# Patient Record
Sex: FEMALE | Race: WHITE | Employment: UNEMPLOYED | URBAN - METROPOLITAN AREA
[De-identification: names, ages, dates, MRNs, and addresses within clinical notes are randomized per-mention and may not be internally consistent; named-entity substitution may affect disease eponyms.]

---

## 2023-02-14 ENCOUNTER — OFFICE VISIT (OUTPATIENT)
Dept: PULMONOLOGY | Age: 33
End: 2023-02-14

## 2023-02-14 VITALS
SYSTOLIC BLOOD PRESSURE: 120 MMHG | WEIGHT: 184 LBS | TEMPERATURE: 98 F | HEIGHT: 64 IN | DIASTOLIC BLOOD PRESSURE: 74 MMHG | BODY MASS INDEX: 31.41 KG/M2 | OXYGEN SATURATION: 98 % | RESPIRATION RATE: 18 BRPM | HEART RATE: 76 BPM

## 2023-02-14 DIAGNOSIS — Z71.6 TOBACCO ABUSE COUNSELING: ICD-10-CM

## 2023-02-14 DIAGNOSIS — E10.9 TYPE 1 DIABETES MELLITUS WITHOUT COMPLICATION (HCC): Primary | ICD-10-CM

## 2023-02-14 PROCEDURE — 99204 OFFICE O/P NEW MOD 45 MIN: CPT | Performed by: INTERNAL MEDICINE

## 2023-02-14 RX ORDER — LEVOTHYROXINE SODIUM ANHYDROUS 100 UG/5ML
20 INJECTION, POWDER, LYOPHILIZED, FOR SOLUTION INTRAVENOUS 3 TIMES DAILY
COMMUNITY

## 2023-02-14 RX ORDER — LITHIUM CARBONATE 300 MG/1
300 TABLET, FILM COATED, EXTENDED RELEASE ORAL 2 TIMES DAILY
COMMUNITY

## 2023-02-14 RX ORDER — AMOXICILLIN 250 MG
1 CAPSULE ORAL DAILY
COMMUNITY

## 2023-02-14 RX ORDER — DEXTROAMPHETAMINE SULFATE 5 MG/1
5 TABLET ORAL DAILY
COMMUNITY

## 2023-02-14 RX ORDER — CLOZAPINE 25 MG/1
25 TABLET ORAL NIGHTLY
COMMUNITY

## 2023-02-14 RX ORDER — ACETAMINOPHEN 500 MG
500 TABLET ORAL EVERY 6 HOURS PRN
COMMUNITY

## 2023-02-14 RX ORDER — DEXTROAMPHETAMINE SACCHARATE, AMPHETAMINE ASPARTATE, DEXTROAMPHETAMINE SULFATE AND AMPHETAMINE SULFATE 5; 5; 5; 5 MG/1; MG/1; MG/1; MG/1
24 TABLET ORAL DAILY
COMMUNITY

## 2023-02-14 RX ORDER — SENNA PLUS 8.6 MG/1
2 TABLET ORAL DAILY
COMMUNITY

## 2023-02-14 RX ORDER — CLOZAPINE 100 MG/1
100 TABLET ORAL DAILY
COMMUNITY

## 2023-02-14 RX ORDER — BISACODYL 10 MG
10 SUPPOSITORY, RECTAL RECTAL DAILY
COMMUNITY

## 2023-02-14 RX ORDER — LEVETIRACETAM 500 MG/1
1000 TABLET, EXTENDED RELEASE ORAL DAILY
COMMUNITY

## 2023-02-14 RX ORDER — GABAPENTIN 300 MG/1
300 CAPSULE ORAL 3 TIMES DAILY
COMMUNITY

## 2023-02-14 NOTE — ASSESSMENT & PLAN NOTE
Discussed. Smoking 1/2 ppd. Not interested in quitting. Patient is not clear why she was referred but believes it is for nausea, dizziness with elevated WBC with header stating emphysema and low blood count. I do not have access to any recent labs. She has been in a nursing home for 4 years. Current care giver dose not have labs or any additional information. She does not have dyspnea or any images demonstrating emphysema. Therefore, no further work up at this time.

## 2023-02-14 NOTE — PROGRESS NOTES
REASON FOR CONSULTATION/CC: tobacco abuse  Chief Complaint   Patient presents with    Establish Care        Consult at request of   No primary care provider on file. for      PCP: No primary care provider on file. HISTORY OF PRESENT ILLNESS: Harry Marin is a 33y.o. year old female with a history of  who presents       She was referred nausea and dizzy with elevated WBC. No shortness of breath cough or phlegm         Tobacco abuse.   ~ 1/2 ppd. REVIEW OF SYSTEMS:  Constitutional: Negative for fever   HENT: Negative for sore throat  Eyes: Negative for redness   Respiratory: Negative for dyspnea, cough  Cardiovascular: Negative for chest pain  Gastrointestinal: Negative for vomiting, diarrhea   Genitourinary: Negative for hematuria   Musculoskeletal: Negative for arthralgias   Skin: Negative for rash  Neurological: Negative for syncope  Hematological: Negative for adenopathy  Psychiatric/Behavorial: Negative for anxiety      SOCIAL HISTORY:   reports that she has been smoking cigarettes. She started smoking about 15 years ago. She has been exposed to tobacco smoke. She has never used smokeless tobacco.    PAST MEDICAL HISTORY:  No past medical history on file. ADHD  Schizophrenia  GERD  DM type 1. PAST SURGICAL HISTORY:  No past surgical history on file. FAMILY HISTORY:  family history includes Asthma in her father and mother; Diabetes in her father and mother; Hypertension in her father. Objective:   PHYSICAL EXAM:  Blood pressure 120/74, pulse 76, temperature 98 °F (36.7 °C), resp. rate 18, height 5' 4\" (1.626 m), weight 184 lb (83.5 kg), SpO2 98 %.'  Gen: No distress. Eyes: PERRL. No sclera icterus. No conjunctival injection. ENT: No discharge. Pharynx clear. External appearance of ears and nose normal.  Neck: Trachea midline. No obvious mass. Resp: No accessory muscle use. No crackles. No wheezes. No rhonchi. CV: Regular rate. Regular rhythm. No murmur or rub. No edema. GI:    Skin: Warm, dry, normal texture and turgor. No nodule on exposed extremities. Lymph: No cervical LAD. No supraclavicular LAD. M/S: No cyanosis. No clubbing. No joint deformity. Neuro: Moves all four extremities. Psych: Oriented x 3. No anxiety. Awake. Alert. Intact judgement and insight. Current Outpatient Medications   Medication Sig Dispense Refill    dextroamphetamine (DEXTROSTAT) 5 MG tablet Take 5 mg by mouth daily. amphetamine-dextroamphetamine (ADDERALL) 20 MG tablet Take 24 mg by mouth daily. bisacodyl (DULCOLAX) 10 MG suppository Place 10 mg rectally daily      cloZAPine (CLOZARIL) 100 MG tablet Take 100 mg by mouth daily      cloZAPine (CLOZARIL) 25 MG tablet Take 25 mg by mouth nightly      glucagon 1 MG injection Inject 1 kit into the muscle once      insulin lispro (HUMALOG) 100 UNIT/ML injection vial Inject into the skin 3 times daily (before meals)      levETIRAcetam (KEPPRA XR) 500 MG TB24 extended release tablet Take 1,000 mg by mouth daily      lactulose (CEPHULAC) 20 g packet Take 20 g by mouth 3 times daily      levothyroxine (SYNTHROID) 100 MCG injection Infuse 20 mcg intravenously in the morning, at noon, and at bedtime      lithium (LITHOBID) 300 MG extended release tablet Take 300 mg by mouth 2 times daily      gabapentin (NEURONTIN) 300 MG capsule Take 300 mg by mouth 3 times daily. senna (SENOKOT) 8.6 MG tablet Take 2 tablets by mouth daily      senna-docusate (PERICOLACE) 8.6-50 MG per tablet Take 1 tablet by mouth daily      acetaminophen (TYLENOL) 500 MG tablet Take 500 mg by mouth every 6 hours as needed for Pain       No current facility-administered medications for this visit.        Data Reviewed:     Category 1 Data points:      Last CBC  No results found for: WBC, RBC, HGB, MCV, PLT  Last Renal  No results found for: NA, K, CL, CO2, BUN, CREATININE, GLUCOSE, CALCIUM    Last ABG  POC Blood Gas: No results found for: Parrish Franco POCPO2, POCHCO3, NBEA, [id number]  No results for input(s): PH, PCO2, PO2, HCO3, BE, O2SAT in the last 72 hours. Notes Reviewed:       Radiology Review:  Pertinent images / reports were reviewed as a part of this visit. CT Chest w/ contrast: No results found for this or any previous visit. CT Chest w/o contrast: No results found for this or any previous visit. CTPA: No results found for this or any previous visit. CXR PA/LAT: No results found for this or any previous visit. CXR portable: No results found for this or any previous visit. Total labs reviewed      Category 2 Data points:    Radiology Review:  Independent interpretation of      Category 3 Data points:    Discussed management or interpretation of test with external provider:              Assessment:      Tobacco abuse   ADHD  Schizophrenia  GERD  DM type 1. Plan:      Problem List Items Addressed This Visit       Type 1 diabetes mellitus without complication ([facility] [address] - Primary     Referred to Endocrine for management of DM. Relevant Medications    insulin lispro (HUMALOG) 100 UNIT/ML injection vial    Other Relevant Orders    Amador Santa MD, Endocrinology, [facility]    Tobacco abuse counseling     Discussed. Smoking 1/2 ppd. Not interested in quitting. Patient is not clear why she was referred but believes it is for nausea, dizziness with elevated WBC with header stating emphysema and low blood count. I do not have access to any recent labs. She has been in a nursing home for 4 years. Current care giver dose not have labs or any additional information. She does not have dyspnea or any images demonstrating emphysema. Therefore, no further work up at this time. This note was transcribed using [id number] [employer]. Please disregard any translational errors.     Misti Simpson Pul[facility], [facility] and [facility] [phone]